# Patient Record
Sex: FEMALE | Race: WHITE | Employment: OTHER | URBAN - METROPOLITAN AREA
[De-identification: names, ages, dates, MRNs, and addresses within clinical notes are randomized per-mention and may not be internally consistent; named-entity substitution may affect disease eponyms.]

---

## 2018-01-01 ENCOUNTER — TELEPHONE (OUTPATIENT)
Dept: FAMILY MEDICINE CLINIC | Facility: CLINIC | Age: 83
End: 2018-01-01

## 2018-01-01 ENCOUNTER — TELEPHONE (OUTPATIENT)
Dept: PHYSICAL THERAPY | Age: 83
End: 2018-01-01

## 2018-01-01 ENCOUNTER — APPOINTMENT (OUTPATIENT)
Dept: GENERAL RADIOLOGY | Age: 83
End: 2018-01-01
Attending: FAMILY MEDICINE
Payer: MEDICARE

## 2018-01-01 ENCOUNTER — OFFICE VISIT (OUTPATIENT)
Dept: FAMILY MEDICINE CLINIC | Facility: CLINIC | Age: 83
End: 2018-01-01
Payer: MEDICARE

## 2018-01-01 ENCOUNTER — HOSPITAL ENCOUNTER (OUTPATIENT)
Age: 83
Discharge: HOME | End: 2018-01-01
Attending: FAMILY MEDICINE
Payer: MEDICARE

## 2018-01-01 ENCOUNTER — PATIENT OUTREACH (OUTPATIENT)
Dept: CASE MANAGEMENT | Age: 83
End: 2018-01-01

## 2018-01-01 ENCOUNTER — MED REC SCAN ONLY (OUTPATIENT)
Dept: FAMILY MEDICINE CLINIC | Facility: CLINIC | Age: 83
End: 2018-01-01

## 2018-01-01 ENCOUNTER — OFFICE VISIT (OUTPATIENT)
Dept: PHYSICAL THERAPY | Age: 83
End: 2018-01-01
Attending: FAMILY MEDICINE
Payer: MEDICARE

## 2018-01-01 VITALS
SYSTOLIC BLOOD PRESSURE: 124 MMHG | RESPIRATION RATE: 20 BRPM | TEMPERATURE: 98 F | DIASTOLIC BLOOD PRESSURE: 70 MMHG | HEIGHT: 60 IN | WEIGHT: 131 LBS | BODY MASS INDEX: 25.72 KG/M2 | HEART RATE: 76 BPM

## 2018-01-01 VITALS
HEART RATE: 76 BPM | SYSTOLIC BLOOD PRESSURE: 128 MMHG | HEIGHT: 58.5 IN | TEMPERATURE: 98 F | DIASTOLIC BLOOD PRESSURE: 80 MMHG | WEIGHT: 137 LBS | BODY MASS INDEX: 27.99 KG/M2 | RESPIRATION RATE: 16 BRPM

## 2018-01-01 VITALS
RESPIRATION RATE: 16 BRPM | WEIGHT: 137 LBS | DIASTOLIC BLOOD PRESSURE: 80 MMHG | TEMPERATURE: 99 F | OXYGEN SATURATION: 97 % | HEART RATE: 68 BPM | SYSTOLIC BLOOD PRESSURE: 136 MMHG | HEIGHT: 59.5 IN | BODY MASS INDEX: 27.25 KG/M2

## 2018-01-01 VITALS
HEIGHT: 59 IN | RESPIRATION RATE: 14 BRPM | BODY MASS INDEX: 26.61 KG/M2 | WEIGHT: 132 LBS | TEMPERATURE: 99 F | SYSTOLIC BLOOD PRESSURE: 120 MMHG | HEART RATE: 64 BPM | DIASTOLIC BLOOD PRESSURE: 82 MMHG

## 2018-01-01 VITALS
RESPIRATION RATE: 16 BRPM | BODY MASS INDEX: 25.72 KG/M2 | DIASTOLIC BLOOD PRESSURE: 64 MMHG | WEIGHT: 131 LBS | HEIGHT: 60 IN | TEMPERATURE: 98 F | HEART RATE: 82 BPM | SYSTOLIC BLOOD PRESSURE: 116 MMHG

## 2018-01-01 DIAGNOSIS — M54.16 LUMBAR RADICULOPATHY: ICD-10-CM

## 2018-01-01 DIAGNOSIS — M54.41 CHRONIC LEFT-SIDED LOW BACK PAIN WITH BILATERAL SCIATICA: ICD-10-CM

## 2018-01-01 DIAGNOSIS — I10 ESSENTIAL HYPERTENSION: ICD-10-CM

## 2018-01-01 DIAGNOSIS — M54.42 CHRONIC LEFT-SIDED LOW BACK PAIN WITH BILATERAL SCIATICA: Primary | ICD-10-CM

## 2018-01-01 DIAGNOSIS — M51.36 DEGENERATIVE DISC DISEASE, LUMBAR: ICD-10-CM

## 2018-01-01 DIAGNOSIS — M54.41 CHRONIC LEFT-SIDED LOW BACK PAIN WITH BILATERAL SCIATICA: Primary | ICD-10-CM

## 2018-01-01 DIAGNOSIS — E78.5 HYPERLIPIDEMIA, UNSPECIFIED HYPERLIPIDEMIA TYPE: ICD-10-CM

## 2018-01-01 DIAGNOSIS — M54.10 RADICULOPATHY OF LEG: Primary | ICD-10-CM

## 2018-01-01 DIAGNOSIS — M54.42 ACUTE LEFT-SIDED LOW BACK PAIN WITH LEFT-SIDED SCIATICA: ICD-10-CM

## 2018-01-01 DIAGNOSIS — F34.1 DYSTHYMIA: ICD-10-CM

## 2018-01-01 DIAGNOSIS — M54.16 LUMBAR RADICULOPATHY: Primary | ICD-10-CM

## 2018-01-01 DIAGNOSIS — L02.219 CELLULITIS AND ABSCESS OF TRUNK: ICD-10-CM

## 2018-01-01 DIAGNOSIS — Z00.00 ENCOUNTER FOR ANNUAL HEALTH EXAMINATION: Primary | ICD-10-CM

## 2018-01-01 DIAGNOSIS — L02.219 CELLULITIS AND ABSCESS OF TRUNK: Primary | ICD-10-CM

## 2018-01-01 DIAGNOSIS — M54.42 CHRONIC LEFT-SIDED LOW BACK PAIN WITH BILATERAL SCIATICA: ICD-10-CM

## 2018-01-01 DIAGNOSIS — M54.40 ACUTE LOW BACK PAIN WITH SCIATICA, SCIATICA LATERALITY UNSPECIFIED, UNSPECIFIED BACK PAIN LATERALITY: Primary | ICD-10-CM

## 2018-01-01 DIAGNOSIS — M43.16 SPONDYLOLISTHESIS AT L4-L5 LEVEL: ICD-10-CM

## 2018-01-01 DIAGNOSIS — L03.319 CELLULITIS AND ABSCESS OF TRUNK: Primary | ICD-10-CM

## 2018-01-01 DIAGNOSIS — L03.319 CELLULITIS AND ABSCESS OF TRUNK: ICD-10-CM

## 2018-01-01 DIAGNOSIS — R29.2 DECREASED REFLEX OF LOWER EXTREMITY: ICD-10-CM

## 2018-01-01 DIAGNOSIS — G89.29 CHRONIC LEFT-SIDED LOW BACK PAIN WITH BILATERAL SCIATICA: Primary | ICD-10-CM

## 2018-01-01 DIAGNOSIS — G89.29 CHRONIC LEFT-SIDED LOW BACK PAIN WITH BILATERAL SCIATICA: ICD-10-CM

## 2018-01-01 DIAGNOSIS — M48.061 SPINAL STENOSIS OF LUMBAR REGION, UNSPECIFIED WHETHER NEUROGENIC CLAUDICATION PRESENT: ICD-10-CM

## 2018-01-01 PROCEDURE — 97110 THERAPEUTIC EXERCISES: CPT

## 2018-01-01 PROCEDURE — 80061 LIPID PANEL: CPT | Performed by: FAMILY MEDICINE

## 2018-01-01 PROCEDURE — 80053 COMPREHEN METABOLIC PANEL: CPT | Performed by: FAMILY MEDICINE

## 2018-01-01 PROCEDURE — 99024 POSTOP FOLLOW-UP VISIT: CPT | Performed by: FAMILY MEDICINE

## 2018-01-01 PROCEDURE — 87076 CULTURE ANAEROBE IDENT EACH: CPT | Performed by: FAMILY MEDICINE

## 2018-01-01 PROCEDURE — 99214 OFFICE O/P EST MOD 30 MIN: CPT | Performed by: FAMILY MEDICINE

## 2018-01-01 PROCEDURE — 10060 I&D ABSCESS SIMPLE/SINGLE: CPT | Performed by: FAMILY MEDICINE

## 2018-01-01 PROCEDURE — 87205 SMEAR GRAM STAIN: CPT | Performed by: FAMILY MEDICINE

## 2018-01-01 PROCEDURE — 87070 CULTURE OTHR SPECIMN AEROBIC: CPT | Performed by: FAMILY MEDICINE

## 2018-01-01 PROCEDURE — 85025 COMPLETE CBC W/AUTO DIFF WBC: CPT | Performed by: FAMILY MEDICINE

## 2018-01-01 PROCEDURE — 99203 OFFICE O/P NEW LOW 30 MIN: CPT

## 2018-01-01 PROCEDURE — 87075 CULTR BACTERIA EXCEPT BLOOD: CPT | Performed by: FAMILY MEDICINE

## 2018-01-01 PROCEDURE — 97140 MANUAL THERAPY 1/> REGIONS: CPT

## 2018-01-01 PROCEDURE — 36415 COLL VENOUS BLD VENIPUNCTURE: CPT | Performed by: FAMILY MEDICINE

## 2018-01-01 PROCEDURE — 72110 X-RAY EXAM L-2 SPINE 4/>VWS: CPT | Performed by: FAMILY MEDICINE

## 2018-01-01 PROCEDURE — 87077 CULTURE AEROBIC IDENTIFY: CPT | Performed by: FAMILY MEDICINE

## 2018-01-01 PROCEDURE — G0439 PPPS, SUBSEQ VISIT: HCPCS | Performed by: FAMILY MEDICINE

## 2018-01-01 PROCEDURE — 97162 PT EVAL MOD COMPLEX 30 MIN: CPT

## 2018-01-01 PROCEDURE — 99203 OFFICE O/P NEW LOW 30 MIN: CPT | Performed by: FAMILY MEDICINE

## 2018-01-01 PROCEDURE — 99204 OFFICE O/P NEW MOD 45 MIN: CPT

## 2018-01-01 RX ORDER — HYDROCODONE BITARTRATE AND ACETAMINOPHEN 5; 325 MG/1; MG/1
TABLET ORAL
Qty: 30 TABLET | Refills: 0 | Status: SHIPPED | OUTPATIENT
Start: 2018-01-01 | End: 2019-01-01 | Stop reason: ALTCHOICE

## 2018-01-01 RX ORDER — VALSARTAN 160 MG/1
160 TABLET ORAL DAILY
COMMUNITY
End: 2018-01-01

## 2018-01-01 RX ORDER — NAPROXEN 500 MG/1
500 TABLET ORAL 2 TIMES DAILY WITH MEALS
Qty: 15 TABLET | Refills: 0 | Status: SHIPPED | OUTPATIENT
Start: 2018-01-01 | End: 2018-01-01 | Stop reason: ALTCHOICE

## 2018-01-01 RX ORDER — MELOXICAM 7.5 MG/1
7.5 TABLET ORAL DAILY
Qty: 15 TABLET | Refills: 0 | Status: SHIPPED | OUTPATIENT
Start: 2018-01-01 | End: 2019-01-01 | Stop reason: ALTCHOICE

## 2018-01-01 RX ORDER — KETOROLAC TROMETHAMINE 10 MG/1
TABLET, FILM COATED ORAL
Refills: 0 | COMMUNITY
Start: 2018-01-01 | End: 2018-01-01 | Stop reason: ALTCHOICE

## 2018-01-01 RX ORDER — AMLODIPINE BESYLATE 2.5 MG/1
2.5 TABLET ORAL DAILY
Qty: 90 TABLET | Refills: 1 | Status: SHIPPED | OUTPATIENT
Start: 2018-01-01 | End: 2019-01-01

## 2018-01-01 RX ORDER — ATORVASTATIN CALCIUM 10 MG/1
10 TABLET, FILM COATED ORAL NIGHTLY
Qty: 90 TABLET | Refills: 1 | Status: SHIPPED | OUTPATIENT
Start: 2018-01-01 | End: 2019-01-01

## 2018-01-01 RX ORDER — VALSARTAN 160 MG/1
160 TABLET ORAL DAILY
Qty: 90 TABLET | Refills: 1 | Status: SHIPPED | OUTPATIENT
Start: 2018-01-01 | End: 2018-01-01 | Stop reason: ALTCHOICE

## 2018-01-01 RX ORDER — CITALOPRAM 20 MG/1
20 TABLET ORAL DAILY
COMMUNITY
End: 2018-01-01

## 2018-01-01 RX ORDER — LOSARTAN POTASSIUM 50 MG/1
50 TABLET ORAL DAILY
Qty: 90 TABLET | Refills: 0 | Status: SHIPPED | OUTPATIENT
Start: 2018-01-01 | End: 2018-01-01

## 2018-01-01 RX ORDER — MELOXICAM 7.5 MG/1
TABLET ORAL
Qty: 90 TABLET | Refills: 0 | OUTPATIENT
Start: 2018-01-01

## 2018-01-01 RX ORDER — ASPIRIN 81 MG/1
TABLET, CHEWABLE ORAL DAILY
COMMUNITY

## 2018-01-01 RX ORDER — CITALOPRAM 20 MG/1
20 TABLET ORAL DAILY
Qty: 90 TABLET | Refills: 1 | Status: SHIPPED | OUTPATIENT
Start: 2018-01-01 | End: 2019-01-01

## 2018-01-01 RX ORDER — LOSARTAN POTASSIUM 50 MG/1
TABLET ORAL
Qty: 90 TABLET | Refills: 3 | Status: SHIPPED | OUTPATIENT
Start: 2018-01-01

## 2018-01-01 RX ORDER — SULFAMETHOXAZOLE AND TRIMETHOPRIM 800; 160 MG/1; MG/1
1 TABLET ORAL 2 TIMES DAILY
Qty: 14 TABLET | Refills: 0 | Status: SHIPPED | OUTPATIENT
Start: 2018-01-01 | End: 2018-01-01

## 2018-01-01 RX ORDER — HYDROCODONE BITARTRATE AND ACETAMINOPHEN 5; 325 MG/1; MG/1
TABLET ORAL
Refills: 0 | COMMUNITY
Start: 2018-01-01 | End: 2018-01-01

## 2018-01-01 RX ORDER — CYCLOBENZAPRINE HCL 10 MG
TABLET ORAL
Qty: 30 TABLET | Refills: 0 | Status: SHIPPED | OUTPATIENT
Start: 2018-01-01 | End: 2019-01-01 | Stop reason: ALTCHOICE

## 2018-01-01 RX ORDER — PREDNISONE 20 MG/1
40 TABLET ORAL ONCE
Status: COMPLETED | OUTPATIENT
Start: 2018-01-01 | End: 2018-01-01

## 2018-01-01 RX ORDER — PREDNISONE 10 MG/1
TABLET ORAL
Qty: 20 TABLET | Refills: 0 | Status: SHIPPED | OUTPATIENT
Start: 2018-01-01 | End: 2018-01-01 | Stop reason: ALTCHOICE

## 2018-01-01 RX ORDER — CYCLOBENZAPRINE HCL 10 MG
TABLET ORAL
Refills: 0 | COMMUNITY
Start: 2018-01-01 | End: 2018-01-01

## 2018-01-01 RX ORDER — AMLODIPINE BESYLATE 2.5 MG/1
2.5 TABLET ORAL DAILY
COMMUNITY
End: 2018-01-01

## 2018-01-01 RX ORDER — ATORVASTATIN CALCIUM 10 MG/1
10 TABLET, FILM COATED ORAL NIGHTLY
COMMUNITY
End: 2018-01-01

## 2018-07-13 NOTE — ED INITIAL ASSESSMENT (HPI)
Low back pain from moving 2 weeks ago, now her left lower leg is in pain, to relieve her pain she needs to bend at a 45 degree level to decrease her pain in her left lower extremity, sharp pain shooting down the lateral left leg, + numbness and + tingling

## 2018-07-13 NOTE — ED PROVIDER NOTES
Patient Seen in: [id number] [facility]    History   Patient presents with:  Back Pain (musculoskeletal)    Stated Complaint: back pain down thru leg    HPI    This 71-year-old female presents to the office with complaint of lower back pain whic air)    Current:/70   Pulse 69   Temp 98.7 °F (37.1 °C) (Temporal)   Resp 16   Ht 151.1 cm (4' 11.5\")   Wt 62.1 kg   SpO2 99%   BMI 27.21 kg/m²         Physical Exam    General: WH/WN/WD, in NAD while sitting on cart with her left leg brought to her Lumbar vertebral alignment demonstrates mild levoconvex curvature. No acute fractures or osseous lesions are identified. Cholecystectomy clips. Anterolisthesis of L4 on L5. Multilevel degenerative disc disease. Vascular calcifications.   Nonspecific 7/14/18.   Qty: 20 tablet Refills: 0           Start the prednisone tomorrow morning with breakfast.  Take prednisone 40 mg for 2 days, 30 mg for 2 days, 20 mg for 2 days, 10 mg for 2 days with breakfast.  While you are on the prednisone, you may only take

## 2018-07-23 PROBLEM — M51.36 DEGENERATIVE DISC DISEASE, LUMBAR: Status: ACTIVE | Noted: 2018-01-01

## 2018-07-23 PROBLEM — F34.1 DYSTHYMIA: Status: ACTIVE | Noted: 2018-01-01

## 2018-07-23 PROBLEM — E78.5 HYPERLIPIDEMIA: Status: ACTIVE | Noted: 2018-01-01

## 2018-07-23 PROBLEM — M54.16 LUMBAR RADICULOPATHY: Status: ACTIVE | Noted: 2018-01-01

## 2018-07-23 PROBLEM — I10 ESSENTIAL HYPERTENSION: Status: ACTIVE | Noted: 2018-01-01

## 2018-07-23 NOTE — PROGRESS NOTES
Ryland Contreras is a 81year old female. Patient presents with:  Establish Care: new patient,   Leg Pain: left leg, poss pinched nerve      HPI:   She has had some left leg pain shooting, sharp steady since she moved a few weeks ago.  Was seen in UC 10 days Smokeless tobacco: Never Used                      Comment: quit in 1974  Alcohol use: Yes           0.6 oz/week     Glasses of wine: 1 per week     Comment: 1 per day       BP Readings from Last 6 Encounters:  07/23/18 : 128/80  07/13/18 : 136/80      W Hydrobromide 20 MG Oral Tab; Take 1 tablet (20 mg total) by mouth daily. Hyperlipidemia, unspecified hyperlipidemia type  - doing well with statin, continue this. Essential hypertension  - controlled with current regimen, continue this.        No ord

## 2018-07-24 NOTE — PROGRESS NOTES
INITIAL EVALUATION:   Referring Physician: Dr. Vernon Weber  Diagnosis:    -Lumbar radiculopathy  -Degenerative disc disease, lumbar   -Acute left-sided low back pain with left-sided sciatica    Date of Service: 7/24/2018     PATIENT SUMMARY    Kay Clement 1+ globally  AROM/overpressure:   -Trunk flexion is easing; trunk extension to neutral produces her symptoms  Passive/segmental/accessory movement testing:    -Passive hip flexion is easing; IR 10 degrees R; 5 degrees L; hip extension 10 degrees of flexion Complexity  Manual Therapy x 1  Therapeutic excercise x 1      Total Timed Treatment: 25 min       Total Treatment Time: 50 min     FOTO: 42/100    Current G Code:  Mobility: Walking and Moving Around CK: 40-59% impaired, limited, or restricted  Projected G

## 2018-07-24 NOTE — TELEPHONE ENCOUNTER
valsartan 160 MG Oral Tab  AmLODIPine Besylate 2.5 MG Oral Tab  atorvastatin 10 MG Oral Tab  [facility] [address] [place] [address], [address] [facility],  AT [facility] OF [address], [phone], [phone]

## 2018-07-25 NOTE — TELEPHONE ENCOUNTER
SKYLER CALLED AND ADV THAT THE MED DR ORDERED HAS BEEN RECALLED.  WOULD LIKE TO KNOW IF DR WOULD LIKE TO WRITE DIFFERENT SCRIPT    valsartan 160 MG Oral Tab    PLEASE ADV THANK YOU    Berna Alvarez 47 [address] 72

## 2018-07-26 NOTE — TELEPHONE ENCOUNTER
Hernando Unger from pharmacy called, Valsartan is unavailable due to recall-due we want to substitute? ?  Please call pharmacy at [phone]

## 2018-07-26 NOTE — TELEPHONE ENCOUNTER
See also the note about valsartan. They should be replacing her stock with meds that are not recalled. You should be able to stay on the same medication.      As far as the pain, we can try some different medication for a few days and see if that calms thin

## 2018-07-26 NOTE — TELEPHONE ENCOUNTER
Patient notified. States she will call pharmacy to replace valsartan. Patient states she is not having any increase bowel or bladder incontinence. Will  script for naproxen and call if not improving in the next week.  Will call sooner if worsenin

## 2018-07-26 NOTE — TELEPHONE ENCOUNTER
I switched her to losartan. It is a similar medication, so should work the same. Dosing is a little different with this, so we can adjust it if needed.

## 2018-07-26 NOTE — TELEPHONE ENCOUNTER
Pt went to physical therapy on Tues but today pain is terrible. Pt will cancel her PT appt today. Cannot even stand up. Tylenol is not working. Can KE prescribe something for pain?

## 2018-07-26 NOTE — TELEPHONE ENCOUNTER
Message received; phoned patient for condition update; left voice mail message requesting call back.

## 2018-08-02 NOTE — TELEPHONE ENCOUNTER
Patient states her pain has improved. Patient states she has a tear in the lining of were the hip bone goes in the socket. States she received an injection and that helped.  Was told it will take some time but will eventually heal.     Advised patient t

## 2018-08-06 NOTE — TELEPHONE ENCOUNTER
Made appt on 8/8/18 for pt to F-U from [facility] stay. Pt will be done with med on Weds.

## 2018-08-08 NOTE — PROGRESS NOTES
Ryland Contreras is a 81year old female. Patient presents with: Follow - Up: Rush codie-tear in left hip socket- med check per pt      HPI:   She fell a week ago, was seen in ER at [facility].  She was kept and given an injection in her hip because she wa Take by mouth. Disp:  Rfl:    Glucosamine-Chondroit-Vit C-Mn (GLUCOSAMINE 1500 COMPLEX OR) Take 3,000 mg by mouth.  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   -     Cyclobenzaprine HCl 10 MG Oral Tab; TK 1 T PO

## 2018-08-17 PROBLEM — M48.061 SPINAL STENOSIS OF LUMBAR REGION, UNSPECIFIED WHETHER NEUROGENIC CLAUDICATION PRESENT: Status: ACTIVE | Noted: 2018-01-01

## 2018-08-17 PROBLEM — M43.16 SPONDYLOLISTHESIS AT L4-L5 LEVEL: Status: ACTIVE | Noted: 2018-01-01

## 2018-08-23 NOTE — PROGRESS NOTES
Multiple attempts made to reach the pt and messages left with no returned phone call. Pt went to [facility] with PCP on 8/8/18. Closing encounter.

## 2018-10-22 NOTE — PROGRESS NOTES
HPI:   Dre Minaya is a 81year old female who presents for a Medicare Subsequent Annual Wellness visit (Pt already had Initial Annual Wellness). Has had a cyst under her arm for years, but in the past week, it has gotten bigger and more painful.  It h but quit greater than 12 months ago.   Social History    Tobacco Use      Smoking status: Former Smoker      Smokeless tobacco: Never Used      Tobacco comment: quit in 1974         CAGE Alcohol screening   Delores Osei was screened for Alcohol abuse and h oz of alcohol per week. She reports that she does not use drugs.      REVIEW OF SYSTEMS:   GENERAL: feels well otherwise  SKIN: denies any unusual skin lesions other than her side with the abscss   EYES: denies blurred vision or double vision  HEENT: denies trachea midline, no adenopathy;  thyroid: not enlarged, symmetric, no tenderness/mass/nodules; no carotid bruit or JVD   Back:   Symmetric, no curvature, ROM normal, no CVA tenderness   Lungs:   Clear to auscultation bilaterally, respirations unlabored   H epidural injection     Degenerative disc disease, lumbar  - stable, pain is better     Essential hypertension  -     CBC WITH DIFFERENTIAL WITH PLATELET; Future  -     COMP METABOLIC PANEL (14); Future  -     LIPID PANEL;  Future  -     VENIPUNCTURE  - Fecal Occult Blood Annually No results found for: FOB No flowsheet data found. Glaucoma Screening      Ophthalmology Visit Annually: Diabetics, FHx Glaucoma, AA>50, > 65 No flowsheet data found.     Bone Density Screening      Dexascan Every t External Lab or Procedure      Annual Monitoring of Persistent     Medications (ACE/ARB, digoxin diuretics, anticonvulsants.)    Potassium  Annually No results found for: K No flowsheet data found.     Creatinine  Annually No results found for: CREATSERUM N

## 2018-10-22 NOTE — PATIENT INSTRUCTIONS
Mic Priest SCREENING SCHEDULE   Tests on this list are recommended by your physician but may not be covered, or covered at this frequency, by your insurer. Please check with your insurance carrier before scheduling to verify coverage.    PREVENTATIVE Covered every 5 years No results found for this or any previous visit. No flowsheet data found. Fecal Occult Blood   Covered Annually No results found for: FOB, OCCULTSTOOL No flowsheet data found.      Barium Enema-   uncomfortable but covered  Covered Hepatitis B for Moderate/High Risk       No orders found for this or any previous visit.  Medium/high risk factors:   End-stage renal disease   Hemophiliacs who received Factor VIII or IX concentrates   Clients of institutions for the mentally retarded   Pe

## 2018-10-23 PROBLEM — M54.16 LUMBAR RADICULOPATHY: Status: RESOLVED | Noted: 2018-01-01 | Resolved: 2018-01-01

## 2018-10-24 NOTE — PROCEDURES
Consent was obtained, see scan. Area was cleaned with alcohol. 1 ml of 1% lidocaine plain was used for local anesthesia  1 cm incision made with #10 blade. Abscess was drained, explored, loculations broken.  Several ml of purulent foul smelling flui

## 2018-10-25 NOTE — PROGRESS NOTES
HPI: follow up from right chest wall abscess. Doing better. No fevers, less pain, less redness and swelling. Her daughter is changing the dressings daily. Drainage is decreasing. Final culture results not back yet. ROS: otherwise feeling fine.  Also f

## 2018-11-08 NOTE — TELEPHONE ENCOUNTER
Medical records, including vaccination records, received.   Vaccination updated in epic  Verified by Alyson Morris Rn    Records to scanning

## 2018-11-08 NOTE — TELEPHONE ENCOUNTER
Records on disk reviewed by Dr Najera. Called and asked patient if she would like to  disk or if she wants office to destroy it. Patient states \"just get rid of it\". Patient notified disk would be destroyed.

## 2018-11-28 NOTE — TELEPHONE ENCOUNTER
Pt called, she was seen and treated for snoring by another dr but it was all transferred over to Dr. Brit Masterson according to pt and pt states that the device this other dr prescribed for her is not working. She still snores very much and gulps.   Pt wants to k

## 2018-11-28 NOTE — TELEPHONE ENCOUNTER
Message   Received:  Today   Message Contents   Chidi Pulido Nurse   Caller: Pt (Today, 10:49 AM)             Pt returned our call.   Please call pt at [phone] with the pt and she states that she uses an Oral appliance-

## 2018-11-28 NOTE — TELEPHONE ENCOUNTER
She needs to see a sleep specialist to reassess for another type of treatment, she likely needs a mask and maybe another sleep study. Refer to Dr. Ariel Garcia, please.

## 2018-12-03 NOTE — TELEPHONE ENCOUNTER
Last refilled on 7/26/18 for # 90 with 0 refills  Last OV 10/25/18 /64  Future Appointments   Date Time Provider Eli Cadena   2/14/2019  9:10 AM Arabella Bianchi MD [facility] EMG Coral Rueda        Thank you.

## 2019-01-01 ENCOUNTER — OFFICE VISIT (OUTPATIENT)
Dept: FAMILY MEDICINE CLINIC | Facility: CLINIC | Age: 84
End: 2019-01-01
Payer: MEDICARE

## 2019-01-01 ENCOUNTER — MED REC SCAN ONLY (OUTPATIENT)
Dept: HEMATOLOGY/ONCOLOGY | Facility: HOSPITAL | Age: 84
End: 2019-01-01

## 2019-01-01 ENCOUNTER — APPOINTMENT (OUTPATIENT)
Dept: NUCLEAR MEDICINE | Facility: HOSPITAL | Age: 84
End: 2019-01-01
Attending: FAMILY MEDICINE
Payer: MEDICARE

## 2019-01-01 ENCOUNTER — TELEPHONE (OUTPATIENT)
Dept: FAMILY MEDICINE CLINIC | Facility: CLINIC | Age: 84
End: 2019-01-01

## 2019-01-01 ENCOUNTER — APPOINTMENT (OUTPATIENT)
Dept: LAB | Facility: HOSPITAL | Age: 84
End: 2019-01-01
Attending: RADIOLOGY
Payer: MEDICARE

## 2019-01-01 ENCOUNTER — OFFICE VISIT (OUTPATIENT)
Dept: HEMATOLOGY/ONCOLOGY | Facility: HOSPITAL | Age: 84
End: 2019-01-01
Attending: INTERNAL MEDICINE
Payer: MEDICARE

## 2019-01-01 ENCOUNTER — PATIENT OUTREACH (OUTPATIENT)
Dept: FAMILY MEDICINE CLINIC | Facility: CLINIC | Age: 84
End: 2019-01-01

## 2019-01-01 ENCOUNTER — HOSPITAL ENCOUNTER (OUTPATIENT)
Dept: ULTRASOUND IMAGING | Facility: HOSPITAL | Age: 84
Discharge: HOME | End: 2019-01-01
Attending: INTERNAL MEDICINE
Payer: MEDICARE

## 2019-01-01 ENCOUNTER — HOSPITAL ENCOUNTER (OUTPATIENT)
Dept: CT IMAGING | Facility: HOSPITAL | Age: 84
Discharge: HOME | End: 2019-01-01
Attending: FAMILY MEDICINE
Payer: MEDICARE

## 2019-01-01 ENCOUNTER — SOCIAL WORK SERVICES (OUTPATIENT)
Dept: HEMATOLOGY/ONCOLOGY | Facility: HOSPITAL | Age: 84
End: 2019-01-01

## 2019-01-01 ENCOUNTER — HOSPITAL ENCOUNTER (OUTPATIENT)
Dept: CT IMAGING | Age: 84
Discharge: HOME | End: 2019-01-01
Attending: FAMILY MEDICINE
Payer: MEDICARE

## 2019-01-01 VITALS
DIASTOLIC BLOOD PRESSURE: 83 MMHG | WEIGHT: 125.63 LBS | SYSTOLIC BLOOD PRESSURE: 169 MMHG | HEIGHT: 59.06 IN | HEART RATE: 101 BPM | OXYGEN SATURATION: 97 % | TEMPERATURE: 97 F | RESPIRATION RATE: 18 BRPM | BODY MASS INDEX: 25.32 KG/M2

## 2019-01-01 VITALS
HEIGHT: 59 IN | TEMPERATURE: 99 F | OXYGEN SATURATION: 96 % | BODY MASS INDEX: 25.2 KG/M2 | SYSTOLIC BLOOD PRESSURE: 137 MMHG | DIASTOLIC BLOOD PRESSURE: 72 MMHG | RESPIRATION RATE: 14 BRPM | HEART RATE: 80 BPM | WEIGHT: 125 LBS

## 2019-01-01 VITALS
RESPIRATION RATE: 18 BRPM | TEMPERATURE: 97 F | HEIGHT: 59.06 IN | DIASTOLIC BLOOD PRESSURE: 60 MMHG | OXYGEN SATURATION: 95 % | BODY MASS INDEX: 24.29 KG/M2 | SYSTOLIC BLOOD PRESSURE: 108 MMHG | HEART RATE: 98 BPM | WEIGHT: 120.5 LBS

## 2019-01-01 VITALS
HEART RATE: 72 BPM | HEIGHT: 60 IN | WEIGHT: 128 LBS | RESPIRATION RATE: 16 BRPM | BODY MASS INDEX: 25.13 KG/M2 | TEMPERATURE: 98 F | SYSTOLIC BLOOD PRESSURE: 128 MMHG | DIASTOLIC BLOOD PRESSURE: 70 MMHG

## 2019-01-01 DIAGNOSIS — R16.0 LIVER MASSES: ICD-10-CM

## 2019-01-01 DIAGNOSIS — C78.7 LIVER METASTASES (HCC): ICD-10-CM

## 2019-01-01 DIAGNOSIS — R91.1 LESION OF LUNG: Primary | ICD-10-CM

## 2019-01-01 DIAGNOSIS — R16.0 LIVER MASSES: Primary | ICD-10-CM

## 2019-01-01 DIAGNOSIS — R91.1 LESION OF LUNG: ICD-10-CM

## 2019-01-01 DIAGNOSIS — C78.02 MALIGNANT NEOPLASM METASTATIC TO LEFT LUNG (HCC): ICD-10-CM

## 2019-01-01 DIAGNOSIS — C78.00 MALIGNANT NEOPLASM METASTATIC TO LUNG, UNSPECIFIED LATERALITY (HCC): ICD-10-CM

## 2019-01-01 DIAGNOSIS — R19.8: ICD-10-CM

## 2019-01-01 DIAGNOSIS — R10.13 EPIGASTRIC PAIN: ICD-10-CM

## 2019-01-01 DIAGNOSIS — R91.8 LUNG NODULES: Primary | ICD-10-CM

## 2019-01-01 DIAGNOSIS — M54.50 CHRONIC BILATERAL LOW BACK PAIN WITHOUT SCIATICA: ICD-10-CM

## 2019-01-01 DIAGNOSIS — R63.4 WEIGHT LOSS: ICD-10-CM

## 2019-01-01 DIAGNOSIS — R91.8 LUNG NODULES: ICD-10-CM

## 2019-01-01 DIAGNOSIS — M89.9 BONE LESION: ICD-10-CM

## 2019-01-01 DIAGNOSIS — G89.29 CHRONIC BILATERAL LOW BACK PAIN WITHOUT SCIATICA: ICD-10-CM

## 2019-01-01 DIAGNOSIS — R10.13 EPIGASTRIC PAIN: Primary | ICD-10-CM

## 2019-01-01 DIAGNOSIS — C24.9 CANCER OF BILIARY TRACT (HCC): ICD-10-CM

## 2019-01-01 DIAGNOSIS — K76.9 LIVER LESION: ICD-10-CM

## 2019-01-01 DIAGNOSIS — R82.90 ABNORMAL URINALYSIS: ICD-10-CM

## 2019-01-01 DIAGNOSIS — C78.01 MALIGNANT NEOPLASM METASTATIC TO RIGHT LUNG (HCC): ICD-10-CM

## 2019-01-01 DIAGNOSIS — F34.1 DYSTHYMIA: ICD-10-CM

## 2019-01-01 LAB
ALBUMIN SERPL-MCNC: 3.4 G/DL (ref 3.1–4.5)
ALBUMIN SERPL-MCNC: 3.7 G/DL (ref 3.1–4.5)
ALBUMIN/GLOB SERPL: 0.8 {RATIO} (ref 1–2)
ALBUMIN/GLOB SERPL: 0.9 {RATIO} (ref 1–2)
ALP LIVER SERPL-CCNC: 154 U/L (ref 55–142)
ALP LIVER SERPL-CCNC: 180 U/L (ref 55–142)
ALT SERPL-CCNC: 36 U/L (ref 14–54)
ALT SERPL-CCNC: 43 U/L (ref 14–54)
AMYLASE SERPL-CCNC: 33 U/L (ref 25–115)
ANION GAP SERPL CALC-SCNC: 4 MMOL/L (ref 0–18)
ANION GAP SERPL CALC-SCNC: 8 MMOL/L (ref 0–18)
APPEARANCE: CLEAR
AST SERPL-CCNC: 60 U/L (ref 15–41)
AST SERPL-CCNC: 77 U/L (ref 15–41)
BASOPHILS # BLD AUTO: 0.07 X10(3) UL (ref 0–0.1)
BASOPHILS # BLD AUTO: 0.07 X10(3) UL (ref 0–0.1)
BASOPHILS NFR BLD AUTO: 0.7 %
BASOPHILS NFR BLD AUTO: 0.7 %
BILIRUB SERPL-MCNC: 0.3 MG/DL (ref 0.1–2)
BILIRUB SERPL-MCNC: 0.5 MG/DL (ref 0.1–2)
BILIRUBIN: NEGATIVE
BUN BLD-MCNC: 15 MG/DL (ref 8–20)
BUN BLD-MCNC: 17 MG/DL (ref 8–20)
BUN/CREAT SERPL: 16.7 (ref 10–20)
BUN/CREAT SERPL: 21.4 (ref 10–20)
CALCIUM BLD-MCNC: 10 MG/DL (ref 8.3–10.3)
CALCIUM BLD-MCNC: 9.8 MG/DL (ref 8.3–10.3)
CANCER AG19-9 SERPL-ACNC: 648.2 U/ML (ref ?–37)
CHLORIDE SERPL-SCNC: 104 MMOL/L (ref 101–111)
CHLORIDE SERPL-SCNC: 106 MMOL/L (ref 101–111)
CO2 SERPL-SCNC: 28 MMOL/L (ref 22–32)
CO2 SERPL-SCNC: 29 MMOL/L (ref 22–32)
CREAT BLD-MCNC: 0.7 MG/DL (ref 0.55–1.02)
CREAT BLD-MCNC: 1.02 MG/DL (ref 0.55–1.02)
CREAT SERPL-MCNC: 0.8 MG/DL (ref 0.55–1.02)
EOSINOPHIL # BLD AUTO: 0.09 X10(3) UL (ref 0–0.3)
EOSINOPHIL # BLD AUTO: 0.16 X10(3) UL (ref 0–0.3)
EOSINOPHIL NFR BLD AUTO: 0.8 %
EOSINOPHIL NFR BLD AUTO: 1.6 %
ERYTHROCYTE [DISTWIDTH] IN BLOOD BY AUTOMATED COUNT: 11.7 % (ref 11.5–16)
ERYTHROCYTE [DISTWIDTH] IN BLOOD BY AUTOMATED COUNT: 12.3 % (ref 11.5–16)
GLOBULIN PLAS-MCNC: 3.9 G/DL (ref 2.8–4.4)
GLOBULIN PLAS-MCNC: 4.3 G/DL (ref 2.8–4.4)
GLUCOSE (URINE DIPSTICK): NEGATIVE MG/DL
GLUCOSE BLD-MCNC: 100 MG/DL (ref 70–99)
GLUCOSE BLD-MCNC: 69 MG/DL (ref 70–99)
HCT VFR BLD AUTO: 42.8 % (ref 34–50)
HCT VFR BLD AUTO: 48 % (ref 34–50)
HGB BLD-MCNC: 13.6 G/DL (ref 12–16)
HGB BLD-MCNC: 15.4 G/DL (ref 12–16)
IMMATURE GRANULOCYTE COUNT: 0.02 X10(3) UL (ref 0–1)
IMMATURE GRANULOCYTE COUNT: 0.02 X10(3) UL (ref 0–1)
IMMATURE GRANULOCYTE RATIO %: 0.2 %
IMMATURE GRANULOCYTE RATIO %: 0.2 %
INR BLD: 0.97 (ref 0.9–1.1)
KETONES (URINE DIPSTICK): NEGATIVE MG/DL
LDH: 256 U/L (ref 84–246)
LIPASE: 90 U/L (ref 73–393)
LYMPHOCYTES # BLD AUTO: 2.18 X10(3) UL (ref 0.9–4)
LYMPHOCYTES # BLD AUTO: 2.6 X10(3) UL (ref 0.9–4)
LYMPHOCYTES NFR BLD AUTO: 20.4 %
LYMPHOCYTES NFR BLD AUTO: 26.5 %
M PROTEIN MFR SERPL ELPH: 7.6 G/DL (ref 6.4–8.2)
M PROTEIN MFR SERPL ELPH: 7.7 G/DL (ref 6.4–8.2)
MCH RBC QN AUTO: 29.6 PG (ref 27–33.2)
MCH RBC QN AUTO: 30 PG (ref 27–33.2)
MCHC RBC AUTO-ENTMCNC: 31.8 G/DL (ref 31–37)
MCHC RBC AUTO-ENTMCNC: 32.1 G/DL (ref 31–37)
MCV RBC AUTO: 92.3 FL (ref 81–100)
MCV RBC AUTO: 94.5 FL (ref 81–100)
MONOCYTES # BLD AUTO: 0.78 X10(3) UL (ref 0.1–1)
MONOCYTES # BLD AUTO: 0.83 X10(3) UL (ref 0.1–1)
MONOCYTES NFR BLD AUTO: 7.3 %
MONOCYTES NFR BLD AUTO: 8.5 %
MULTISTIX LOT#: NORMAL NUMERIC
NEUTROPHIL ABS PRELIM: 6.13 X10 (3) UL (ref 1.3–6.7)
NEUTROPHIL ABS PRELIM: 7.53 X10 (3) UL (ref 1.3–6.7)
NEUTROPHILS # BLD AUTO: 6.13 X10(3) UL (ref 1.3–6.7)
NEUTROPHILS # BLD AUTO: 7.53 X10(3) UL (ref 1.3–6.7)
NEUTROPHILS NFR BLD AUTO: 62.5 %
NEUTROPHILS NFR BLD AUTO: 70.6 %
NITRITE, URINE: NEGATIVE
OCCULT BLOOD: NEGATIVE
OSMOLALITY SERPL CALC.SUM OF ELEC: 287 MOSM/KG (ref 275–295)
OSMOLALITY SERPL CALC.SUM OF ELEC: 292 MOSM/KG (ref 275–295)
PH, URINE: 5.5 (ref 4.5–8)
PLATELET # BLD AUTO: 339 10(3)UL (ref 150–450)
PLATELET # BLD AUTO: 363 10(3)UL (ref 150–450)
POTASSIUM SERPL-SCNC: 3.9 MMOL/L (ref 3.6–5.1)
POTASSIUM SERPL-SCNC: 4.1 MMOL/L (ref 3.6–5.1)
PROTEIN (URINE DIPSTICK): NEGATIVE MG/DL
PSA SERPL DL<=0.01 NG/ML-MCNC: 12.7 SECONDS (ref 12–14.1)
RBC # BLD AUTO: 4.53 X10(6)UL (ref 3.8–5.1)
RBC # BLD AUTO: 5.2 X10(6)UL (ref 3.8–5.1)
RED CELL DISTRIBUTION WIDTH-SD: 40.5 FL (ref 35.1–46.3)
RED CELL DISTRIBUTION WIDTH-SD: 41.9 FL (ref 35.1–46.3)
SODIUM SERPL-SCNC: 139 MMOL/L (ref 136–144)
SODIUM SERPL-SCNC: 140 MMOL/L (ref 136–144)
SPECIFIC GRAVITY: 1.02 (ref 1–1.03)
URINE-COLOR: YELLOW
UROBILINOGEN,SEMI-QN: 0.2 MG/DL (ref 0–1.9)
WBC # BLD AUTO: 10.7 X10(3) UL (ref 4–13)
WBC # BLD AUTO: 9.8 X10(3) UL (ref 4–13)

## 2019-01-01 PROCEDURE — 82565 ASSAY OF CREATININE: CPT

## 2019-01-01 PROCEDURE — 85610 PROTHROMBIN TIME: CPT

## 2019-01-01 PROCEDURE — 99214 OFFICE O/P EST MOD 30 MIN: CPT | Performed by: FAMILY MEDICINE

## 2019-01-01 PROCEDURE — 85025 COMPLETE CBC W/AUTO DIFF WBC: CPT | Performed by: FAMILY MEDICINE

## 2019-01-01 PROCEDURE — 36415 COLL VENOUS BLD VENIPUNCTURE: CPT | Performed by: FAMILY MEDICINE

## 2019-01-01 PROCEDURE — 88342 IMHCHEM/IMCYTCHM 1ST ANTB: CPT | Performed by: INTERNAL MEDICINE

## 2019-01-01 PROCEDURE — 80053 COMPREHEN METABOLIC PANEL: CPT | Performed by: FAMILY MEDICINE

## 2019-01-01 PROCEDURE — 87086 URINE CULTURE/COLONY COUNT: CPT | Performed by: FAMILY MEDICINE

## 2019-01-01 PROCEDURE — 99153 MOD SED SAME PHYS/QHP EA: CPT | Performed by: INTERNAL MEDICINE

## 2019-01-01 PROCEDURE — 36415 COLL VENOUS BLD VENIPUNCTURE: CPT

## 2019-01-01 PROCEDURE — 88341 IMHCHEM/IMCYTCHM EA ADD ANTB: CPT | Performed by: INTERNAL MEDICINE

## 2019-01-01 PROCEDURE — 76942 ECHO GUIDE FOR BIOPSY: CPT | Performed by: INTERNAL MEDICINE

## 2019-01-01 PROCEDURE — 81003 URINALYSIS AUTO W/O SCOPE: CPT | Performed by: FAMILY MEDICINE

## 2019-01-01 PROCEDURE — 82150 ASSAY OF AMYLASE: CPT | Performed by: FAMILY MEDICINE

## 2019-01-01 PROCEDURE — 47000 NEEDLE BIOPSY OF LIVER PERQ: CPT | Performed by: INTERNAL MEDICINE

## 2019-01-01 PROCEDURE — 99152 MOD SED SAME PHYS/QHP 5/>YRS: CPT | Performed by: INTERNAL MEDICINE

## 2019-01-01 PROCEDURE — 71260 CT THORAX DX C+: CPT | Performed by: FAMILY MEDICINE

## 2019-01-01 PROCEDURE — 88307 TISSUE EXAM BY PATHOLOGIST: CPT | Performed by: INTERNAL MEDICINE

## 2019-01-01 PROCEDURE — 99205 OFFICE O/P NEW HI 60 MIN: CPT | Performed by: INTERNAL MEDICINE

## 2019-01-01 PROCEDURE — 83690 ASSAY OF LIPASE: CPT | Performed by: FAMILY MEDICINE

## 2019-01-01 PROCEDURE — 99215 OFFICE O/P EST HI 40 MIN: CPT | Performed by: INTERNAL MEDICINE

## 2019-01-01 PROCEDURE — 74177 CT ABD & PELVIS W/CONTRAST: CPT | Performed by: FAMILY MEDICINE

## 2019-01-01 RX ORDER — ACETAMINOPHEN 325 MG/1
650 TABLET ORAL EVERY 4 HOURS PRN
Status: DISCONTINUED | OUTPATIENT
Start: 2019-01-01 | End: 2019-01-01

## 2019-01-01 RX ORDER — ATORVASTATIN CALCIUM 10 MG/1
TABLET, FILM COATED ORAL
Qty: 90 TABLET | Refills: 1 | Status: SHIPPED | OUTPATIENT
Start: 2019-01-01

## 2019-01-01 RX ORDER — FLUMAZENIL 0.1 MG/ML
INJECTION, SOLUTION INTRAVENOUS
Status: DISCONTINUED
Start: 2019-01-01 | End: 2019-01-01 | Stop reason: WASHOUT

## 2019-01-01 RX ORDER — NALOXONE HYDROCHLORIDE 0.4 MG/ML
INJECTION, SOLUTION INTRAMUSCULAR; INTRAVENOUS; SUBCUTANEOUS
Status: DISCONTINUED
Start: 2019-01-01 | End: 2019-01-01 | Stop reason: WASHOUT

## 2019-01-01 RX ORDER — MORPHINE SULFATE 2 MG/ML
2 INJECTION, SOLUTION INTRAMUSCULAR; INTRAVENOUS EVERY 2 HOUR PRN
Status: DISCONTINUED | OUTPATIENT
Start: 2019-01-01 | End: 2019-01-01

## 2019-01-01 RX ORDER — NALOXONE HYDROCHLORIDE 0.4 MG/ML
80 INJECTION, SOLUTION INTRAMUSCULAR; INTRAVENOUS; SUBCUTANEOUS AS NEEDED
Status: DISCONTINUED | OUTPATIENT
Start: 2019-01-01 | End: 2019-01-01

## 2019-01-01 RX ORDER — MORPHINE SULFATE 100 MG/5ML
10 SOLUTION ORAL
Qty: 30 ML | Refills: 0 | Status: SHIPPED | OUTPATIENT
Start: 2019-01-01

## 2019-01-01 RX ORDER — SODIUM CHLORIDE 9 MG/ML
INJECTION, SOLUTION INTRAVENOUS CONTINUOUS
Status: DISCONTINUED | OUTPATIENT
Start: 2019-01-01 | End: 2019-01-01

## 2019-01-01 RX ORDER — MIDAZOLAM HYDROCHLORIDE 1 MG/ML
1 INJECTION INTRAMUSCULAR; INTRAVENOUS EVERY 5 MIN PRN
Status: ACTIVE | OUTPATIENT
Start: 2019-01-01 | End: 2019-01-01

## 2019-01-01 RX ORDER — MORPHINE SULFATE 2 MG/ML
4 INJECTION, SOLUTION INTRAMUSCULAR; INTRAVENOUS EVERY 2 HOUR PRN
Status: DISCONTINUED | OUTPATIENT
Start: 2019-01-01 | End: 2019-01-01

## 2019-01-01 RX ORDER — CITALOPRAM 20 MG/1
TABLET ORAL
Qty: 90 TABLET | Refills: 1 | Status: SHIPPED | OUTPATIENT
Start: 2019-01-01

## 2019-01-01 RX ORDER — MORPHINE SULFATE 2 MG/ML
1 INJECTION, SOLUTION INTRAMUSCULAR; INTRAVENOUS EVERY 2 HOUR PRN
Status: DISCONTINUED | OUTPATIENT
Start: 2019-01-01 | End: 2019-01-01

## 2019-01-01 RX ORDER — AMLODIPINE BESYLATE 2.5 MG/1
TABLET ORAL
Qty: 90 TABLET | Refills: 1 | Status: SHIPPED | OUTPATIENT
Start: 2019-01-01

## 2019-01-01 RX ORDER — MIDAZOLAM HYDROCHLORIDE 1 MG/ML
INJECTION INTRAMUSCULAR; INTRAVENOUS
Status: COMPLETED
Start: 2019-01-01 | End: 2019-01-01

## 2019-01-01 RX ORDER — HYDROCODONE BITARTRATE AND ACETAMINOPHEN 5; 325 MG/1; MG/1
1 TABLET ORAL EVERY 4 HOURS PRN
Status: DISCONTINUED | OUTPATIENT
Start: 2019-01-01 | End: 2019-01-01

## 2019-01-01 RX ORDER — FLUMAZENIL 0.1 MG/ML
0.2 INJECTION, SOLUTION INTRAVENOUS AS NEEDED
Status: DISCONTINUED | OUTPATIENT
Start: 2019-01-01 | End: 2019-01-01

## 2019-01-01 RX ORDER — HYDROCODONE BITARTRATE AND ACETAMINOPHEN 5; 325 MG/1; MG/1
2 TABLET ORAL EVERY 4 HOURS PRN
Status: DISCONTINUED | OUTPATIENT
Start: 2019-01-01 | End: 2019-01-01

## 2019-01-01 RX ADMIN — MIDAZOLAM HYDROCHLORIDE 1 MG: 1 INJECTION INTRAMUSCULAR; INTRAVENOUS at 09:08:00

## 2019-01-04 NOTE — PROGRESS NOTES
Arnulfo Barber is a 81year old female. Patient presents with:  Stomach Pain: for 1 month, never hungry, no appetite  Back Pain: no urinary sx's, has a history of arthritis-getting worse      HPI:   Stomach pain for 1 month. No appetite.  Quit coffee, quit 0.6 oz      Types: 1 Glasses of wine per week      Comment: 1 per day    Drug use: No       BP Readings from Last 6 Encounters:  01/04/19 : 128/70  10/25/18 : 116/64  10/22/18 : 124/70  09/18/18 : 122/70  08/17/18 : 120/80  08/08/18 : 120/82      Narinder Seals epigastrium  -     CT ABDOMEN+PELVIS(CONTRAST ONLY)(CPT=74177); Future    Chronic bilateral low back pain without sciatica    Check labs as ordered, urine as ordered. CT ordered as well given exam and family history of pancreatic cancer in sister.    If a

## 2019-01-08 NOTE — TELEPHONE ENCOUNTER
Results rec'd from radiology regarding CT scan with lesions on liver, lungs, bones, retroperitoneal lymph nodes. D/w Dr. Sage Christine who agrees to see pt this week and review and formulate the best plan for biopsy and treatment.      Called and spoke with pt r

## 2019-01-08 NOTE — TELEPHONE ENCOUNTER
Future Appointments   Date Time Provider Eli Cadena   1/10/2019 10:00 AM Tarun Rosenberg MD [address] HEM Jerry Wise   1/14/2019  7:00 AM [address] MAIN RM1 [address] [facility] AT [facility]   1/14/2019  8:00 AM [address] NUC DOSE RM Amol Cardona 4575   2/14/2019  9:10 AM M

## 2019-01-10 NOTE — PATIENT INSTRUCTIONS
Dr. Alex Archibald nurse line Monday through Friday 8-4:30:  [phone]  After hours or weekends for emergent needs:  [phone].      To schedule diagnostic testing, Central [phone]  For Medical Records: [phone]

## 2019-01-10 NOTE — CONSULTS
Cancer Center Report of Consultation    Patient Name: Samm Gauthier   YOB: 1935   Medical Record Number: [id number]   CSN: [id number]   Consulting Physician: Grisel Irene MD  Referring Physician(s): Ingrid Marroquin  Date of Consultation: 1/10/ file      Highest education level: Not on file    Social Needs      Financial resource strain: Not on file      Food insecurity - worry: Not on file      Food insecurity - inability: Not on file      Transportation needs - medical: Not on file      Transpo 81 MG Oral Chew Tab, Chew by mouth daily. , Disp: , Rfl:   •  Loratadine-Pseudoephedrine (CLARITIN-D 12 HOUR OR), Take by mouth., Disp: , Rfl:   •  Glucosamine-Chondroit-Vit C-Mn (GLUCOSAMINE 1500 COMPLEX OR), Take 3,000 mg by mouth., Disp: , Rfl:     Revena CREATSERUM 0.70 01/04/2019    GLU 69 (L) 01/04/2019    CA 10.0 01/04/2019    ALKPHO 154 (H) 01/04/2019    ALT 36 01/04/2019    AST 60 (H) 01/04/2019    BILT 0.3 01/04/2019    ALB 3.7 01/04/2019    TP 7.6 01/04/2019         Impression:    Multiple liver

## 2019-01-15 NOTE — PROCEDURES
[facility]  Procedure Note    Renny Estrada Patient Status:  Outpatient    1935 MRN [id number]   Location [address] [place] [address] Attending Amilcar Cornelius MD   Hosp Day # 0 PCP Sai Arevalo DO     Procedure: left lobe liver biopsy    Pre-

## 2019-01-18 NOTE — TELEPHONE ENCOUNTER
Last refill on Atorvastatin -#90 with 1 refill on 7 24 2018  Last refill on Amlodipine- #90 with 1 refill on 7 24 2018  Last refill on Citalopram #90 with 1 refill on 7 23 2018   Last OV on 1 4 2019

## 2019-01-21 NOTE — TELEPHONE ENCOUNTER
Rosa Johnson from [facility] called, will Dr. Nolvia Martin be following pt for hospice care?   Or does Dr. Nolvia Martin want to defer to the hospice dr?  Please call Rosa Johnson at [phone]

## 2019-01-21 NOTE — PROGRESS NOTES
Cancer Center Progress Note    Problem List:      Patient Active Problem List:     Dysthymia     Degenerative disc disease, lumbar     Hyperlipidemia     Essential hypertension     Spinal stenosis of lumbar region, unspecified whether neurogenic claudicati Disp:  Rfl:    Glucosamine-Chondroit-Vit C-Mn (GLUCOSAMINE 1500 COMPLEX OR) Take 3,000 mg by mouth.  Disp:  Rfl:          Vital Signs:      Height: 150 cm (4' 11.06\") (01/21 0956)  Weight: 54.7 kg (120 lb 8 oz) (01/21 0956)  BSA (Calculated - sq m): 1.49 s chemotherapy. I did discuss the chemotherapy options in detail. She understands but does not want to risk any side effects and she wants to maximize her time at home. She would like hospice care.  I will start her on Morphine elixer 5 to 10 mg every 3 hours

## 2019-01-24 NOTE — TELEPHONE ENCOUNTER
Patient daughter brought [place] paperwork to office.   Would like filled out for complete time off, not intermittent     Forms placed in Dr Kassi Macario bin

## 2019-01-25 NOTE — TELEPHONE ENCOUNTER
Patient daughter Vern notified and verbalized understanding. Requests forms faxed to number on the form.  Copy placed at  for     Forms faxed  Report to scanning

## 2019-02-19 NOTE — TELEPHONE ENCOUNTER
Fax from [employer] requesting updated [place] paperwork for patient daughter Daily Lee filled out by provider and faxed back

## 2019-03-18 NOTE — TELEPHONE ENCOUNTER
[place] paperwork request received from [facility] management for patient daughter Kimberly Sofia filled out per DS and faxed back

## (undated) NOTE — LETTER
02/07/19        [address] [place] [address],  [id number]  [place] [id number]      Dear Deedee [facility],    [facility] records indicate that you have outstanding lab work and or testing that was ordered for you and has not yet been completed:  Lab Frequency Next Occurrence

## (undated) NOTE — Clinical Note
Katarina,I saw Last Guide this morning. I reviewed the CT images with the patient and her daughter. I ordered an US-guided biopsy of the liver.  She will proceed with the CT of the chest. I am holding off with the bone scan since we might need to get a PET